# Patient Record
Sex: MALE | Race: WHITE | URBAN - METROPOLITAN AREA
[De-identification: names, ages, dates, MRNs, and addresses within clinical notes are randomized per-mention and may not be internally consistent; named-entity substitution may affect disease eponyms.]

---

## 2018-11-28 ENCOUNTER — OFFICE VISIT (OUTPATIENT)
Dept: FAMILY MEDICINE | Facility: CLINIC | Age: 33
End: 2018-11-28
Payer: COMMERCIAL

## 2018-11-28 VITALS
WEIGHT: 193 LBS | BODY MASS INDEX: 25.58 KG/M2 | DIASTOLIC BLOOD PRESSURE: 68 MMHG | HEART RATE: 67 BPM | SYSTOLIC BLOOD PRESSURE: 102 MMHG | TEMPERATURE: 96.3 F | HEIGHT: 73 IN

## 2018-11-28 DIAGNOSIS — R41.89 COGNITIVE CHANGES: Primary | ICD-10-CM

## 2018-11-28 LAB
BASOPHILS # BLD AUTO: 0 10E9/L (ref 0–0.2)
BASOPHILS NFR BLD AUTO: 0.5 %
CORTIS SERPL-MCNC: 8.9 UG/DL (ref 4–22)
DIFFERENTIAL METHOD BLD: NORMAL
EOSINOPHIL # BLD AUTO: 0.1 10E9/L (ref 0–0.7)
EOSINOPHIL NFR BLD AUTO: 1.9 %
ERYTHROCYTE [DISTWIDTH] IN BLOOD BY AUTOMATED COUNT: 13.1 % (ref 10–15)
HCT VFR BLD AUTO: 45.6 % (ref 40–53)
HGB BLD-MCNC: 15.4 G/DL (ref 13.3–17.7)
LYMPHOCYTES # BLD AUTO: 2.5 10E9/L (ref 0.8–5.3)
LYMPHOCYTES NFR BLD AUTO: 38.7 %
MCH RBC QN AUTO: 29.7 PG (ref 26.5–33)
MCHC RBC AUTO-ENTMCNC: 33.8 G/DL (ref 31.5–36.5)
MCV RBC AUTO: 88 FL (ref 78–100)
MONOCYTES # BLD AUTO: 0.6 10E9/L (ref 0–1.3)
MONOCYTES NFR BLD AUTO: 9.7 %
NEUTROPHILS # BLD AUTO: 3.2 10E9/L (ref 1.6–8.3)
NEUTROPHILS NFR BLD AUTO: 49.2 %
PLATELET # BLD AUTO: 365 10E9/L (ref 150–450)
RBC # BLD AUTO: 5.18 10E12/L (ref 4.4–5.9)
VIT B12 SERPL-MCNC: 745 PG/ML (ref 193–986)
WBC # BLD AUTO: 6.4 10E9/L (ref 4–11)

## 2018-11-28 PROCEDURE — 85025 COMPLETE CBC W/AUTO DIFF WBC: CPT | Performed by: NURSE PRACTITIONER

## 2018-11-28 PROCEDURE — 84270 ASSAY OF SEX HORMONE GLOBUL: CPT | Performed by: NURSE PRACTITIONER

## 2018-11-28 PROCEDURE — 82533 TOTAL CORTISOL: CPT | Performed by: NURSE PRACTITIONER

## 2018-11-28 PROCEDURE — 84403 ASSAY OF TOTAL TESTOSTERONE: CPT | Performed by: NURSE PRACTITIONER

## 2018-11-28 PROCEDURE — 36415 COLL VENOUS BLD VENIPUNCTURE: CPT | Performed by: NURSE PRACTITIONER

## 2018-11-28 PROCEDURE — 82607 VITAMIN B-12: CPT | Performed by: NURSE PRACTITIONER

## 2018-11-28 PROCEDURE — 84443 ASSAY THYROID STIM HORMONE: CPT | Performed by: NURSE PRACTITIONER

## 2018-11-28 PROCEDURE — 84439 ASSAY OF FREE THYROXINE: CPT | Performed by: NURSE PRACTITIONER

## 2018-11-28 PROCEDURE — 86780 TREPONEMA PALLIDUM: CPT | Performed by: NURSE PRACTITIONER

## 2018-11-28 PROCEDURE — 99204 OFFICE O/P NEW MOD 45 MIN: CPT | Performed by: NURSE PRACTITIONER

## 2018-11-28 ASSESSMENT — ANXIETY QUESTIONNAIRES
1. FEELING NERVOUS, ANXIOUS, OR ON EDGE: SEVERAL DAYS
7. FEELING AFRAID AS IF SOMETHING AWFUL MIGHT HAPPEN: SEVERAL DAYS
IF YOU CHECKED OFF ANY PROBLEMS ON THIS QUESTIONNAIRE, HOW DIFFICULT HAVE THESE PROBLEMS MADE IT FOR YOU TO DO YOUR WORK, TAKE CARE OF THINGS AT HOME, OR GET ALONG WITH OTHER PEOPLE: SOMEWHAT DIFFICULT
3. WORRYING TOO MUCH ABOUT DIFFERENT THINGS: SEVERAL DAYS
GAD7 TOTAL SCORE: 4
5. BEING SO RESTLESS THAT IT IS HARD TO SIT STILL: NOT AT ALL
2. NOT BEING ABLE TO STOP OR CONTROL WORRYING: NOT AT ALL
6. BECOMING EASILY ANNOYED OR IRRITABLE: SEVERAL DAYS

## 2018-11-28 ASSESSMENT — PATIENT HEALTH QUESTIONNAIRE - PHQ9
SUM OF ALL RESPONSES TO PHQ QUESTIONS 1-9: 2
5. POOR APPETITE OR OVEREATING: NOT AT ALL

## 2018-11-28 NOTE — MR AVS SNAPSHOT
"              After Visit Summary   11/28/2018    Ever Palm    MRN: [id number]           Patient Information     Date Of Birth          1985        Visit Information        Provider Department      11/28/2018 11:40 AM Ramses Syed NP [facility], [place]        Today's Diagnoses     Cognitive changes    -  1      Care Instructions    1. Labs today. I will be in touch with results.    2. If we strike out with more obvious and common causes (endocrine function, depression / anxiety), we can move to neuropsychological testing and/or imaging.           Follow-ups after your visit        Follow-up notes from your care team     Return in about 2 weeks (around 12/12/2018).      Who to contact     If you have questions or need follow up information about today's clinic visit or your schedule please contact [facility] [place] directly at [phone].  Normal or non-critical lab and imaging results will be communicated to you by MyChart, letter or phone within 4 business days after the clinic has received the results. If you do not hear from us within 7 days, please contact the clinic through MyChart or phone. If you have a critical or abnormal lab result, we will notify you by phone as soon as possible.  Submit refill requests through [facility] or call your pharmacy and they will forward the refill request to us. Please allow 3 business days for your refill to be completed.          Additional Information About Your Visit        MyChart Information     [facility] [facility] lets you send messages to your doctor, view your test results, renew your prescriptions, schedule appointments and more. To sign up, go to [email or web address] . Click on \"Log in\" on the left side of the screen, which will take you to the Welcome page. Then click on \"Sign up Now\" on the right side of the page.     You will be asked to enter the access code listed below, as well as some personal information. Please follow the " "directions to create your username and password.     Your access code is: [email or web address]  Expires: 2019 12:15 PM     Your access code will  in 90 days. If you need help or a new code, please call your [place] clinic or [phone].        Care EveryWhere ID     This is your Care EveryWhere ID. This could be used by other organizations to access your [place] medical records  [id number]        Your Vitals Were     Pulse Temperature Height BMI (Body Mass Index)          67 96.3  F (35.7  C) (Tympanic) 6' 1\" (1.854 m) 25.46 kg/m2         Blood Pressure from Last 3 Encounters:   18 102/68    Weight from Last 3 Encounters:   18 193 lb (87.5 kg)              We Performed the Following     CBC with platelets and differential     Cortisol     Treponema Abs w Reflex to RPR and Titer     TSH with free T4 reflex     Vitamin B12        Primary Care Provider Office Phone # Fax #    [facility] [phone]       [address] [place] [id number]        Equal Access to Services     JOSIE MONZON AH: Hadii aad ku hadasho Soomaali, waaxda luqadaha, qaybta kaalmada adeegyada, waxay feleciain hayinderjitn donna cain . So [facility] [phone].    ATENCIÓN: Si habla español, tiene a clay disposición servicios gratuitos de asistencia lingüística. Llame al [phone].    We comply with applicable federal civil rights laws and [place] laws. We do not discriminate on the basis of race, color, national origin, age, disability, sex, sexual orientation, or gender identity.            Thank you!     Thank you for choosing [facility]  for your care. Our goal is always to provide you with excellent care. Hearing back from our patients is one way we can continue to improve our services. Please take a few minutes to complete the written survey that you may receive in the mail after your visit with us. Thank you!             Your Updated Medication List - Protect " others around you: Learn how to safely use, store and throw away your medicines at www.disposemymeds.org.      Notice  As of 11/28/2018 12:15 PM    You have not been prescribed any medications.

## 2018-11-28 NOTE — PATIENT INSTRUCTIONS
1. Labs today. I will be in touch with results.    2. If we strike out with more obvious and common causes (endocrine function, depression / anxiety), we can move to neuropsychological testing and/or imaging.

## 2018-11-28 NOTE — PROGRESS NOTES
"  SUBJECTIVE:                                                      Ever Palm is a 33 year old male who presents to clinic today for the following health issues:    Concern - Pt states that 2 weeks ago he had a cold, is over it for the most part but hasn't recovered from the fatigue, being run down and \"foggy headed\".  Feels like he is in a daze. Gets about 6.5 hours of sleep a night. Drinks coffee daily.     HPI: For past two to three months has noted feeling \"cloudy-headed.\" Focus and memory have suffered dramatically. Forgets names. Has lost his \"mental edge\" at work and feels like his concentration and retention of information have been much poorer lately. 4 kids (two sets of twins - 4 and 1 years). No medications or supplements. Diet is normal with no changes. No physical symptoms (some fatigue but no worse lately). Feels a bit overwhelmed at home, so anxiety could be a contributor (he and wife work opposite schedules). No passing out, dizziness, or feelings of heart racing or beating irregularly. No family history of early-onset dementia or Parkinson's Disease. No alcohol or drug use. No tremor or other motor symptoms. No sensory changes / deficit. No headache, vision, or hearing changes. No fever, chills, night sweats, unintentional weight loss. Had a rash on his hands / palms last year.    Problem list and histories reviewed & adjusted, as indicated.  Additional history: as documented    Reviewed and updated as needed this visit by clinical staff  Tobacco  Allergies  Meds  Problems  Med Hx  Surg Hx  Fam Hx  Soc Hx        Reviewed and updated as needed this visit by Provider  Tobacco  Allergies  Meds  Problems  Med Hx  Surg Hx  Fam Hx  Soc Hx        ROS:  Constitutional, HEENT, cardiovascular, pulmonary, GI, musculoskeletal, neuro, skin, endocrine and psych systems are negative, except as otherwise noted.    OBJECTIVE:                                                      /68 (BP " "Location: Right arm, Patient Position: Chair, Cuff Size: Adult Regular)  Pulse 67  Temp 96.3  F (35.7  C) (Tympanic)  Ht 6' 1\" (1.854 m)  Wt 193 lb (87.5 kg)  BMI 25.46 kg/m2 Body mass index is 25.46 kg/(m^2).   GENERAL: healthy, alert, well nourished, well hydrated, no distress  HENT: ear canals- normal; TMs- normal; Nose- normal; Mouth- no ulcers, no lesions  NECK: no tenderness, no adenopathy, no asymmetry, no masses, no stiffness; thyroid- normal to palpation  RESP: lungs clear to auscultation - no rales, no rhonchi, no wheezes  CV: regular rates and rhythm, normal S1 S2, no S3 or S4 and no murmur, no click or rub -  SKIN: no suspicious lesions, no rashes  NEURO: strength and tone- normal, sensory exam- grossly normal, mentation- intact, speech- normal, reflexes- symmetric  PSYCH: Alert and oriented times 3; speech- coherent , normal rate and volume; able to articulate logical thoughts, able to abstract reason, no tangential thoughts, no hallucinations or delusions, affect- normal  PHQ-9 SCORE 11/28/2018   PHQ-9 Total Score 2     ELIJAH-7 SCORE 11/28/2018   Total Score 4       Diagnostic test results:  No results found for this or any previous visit (from the past 24 hour(s)).    ASSESSMENT/PLAN:                                                      Ever was seen today for fatigue. No obvious culprit for his symptoms. 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 Second most likely would be anxiety and/or depression. PHQ and ELIJAH do not necessarily reflect this, but he endorses feeling overwhelmed and irritable more than is typical for him. Third in line in the differential would be endocrine / metabolic / hematologic abnormalities. Will check TSH, cortisol, testosterone, B12, and CBC today. Will also check syphilis given mysterious rash noted on his hands last year. If none " of these studies are revealing, will think about neuropsychological testing +/- imaging. He agrees with this plan, and all questions answered. Will follow up with him when these labs have resulted.     Diagnoses and all orders for this visit:    Cognitive changes  -     TSH with free T4 reflex  -     Cortisol  -     Vitamin B12  -     CBC with platelets and differential  -     Treponema Abs w Reflex to RPR and Titer  -     Testosterone Free and Total      Risks, benefits and alternatives of treatments discussed. Plan agreed upon and all questions answered.      Follow-Up: Return in about 2 weeks (around 12/12/2018).    See Patient Instructions      Ramses Syed, APRN, CNP

## 2018-11-29 LAB
T PALLIDUM AB SER QL: NONREACTIVE
T4 FREE SERPL-MCNC: 1.13 NG/DL (ref 0.76–1.46)
TSH SERPL DL<=0.005 MIU/L-ACNC: 5.68 MU/L (ref 0.4–4)

## 2018-11-29 ASSESSMENT — ANXIETY QUESTIONNAIRES: GAD7 TOTAL SCORE: 4

## 2018-11-30 LAB
SHBG SERPL-SCNC: 65 NMOL/L (ref 11–80)
TESTOST FREE SERPL-MCNC: 9.93 NG/DL (ref 4.7–24.4)
TESTOST SERPL-MCNC: 714 NG/DL (ref 240–950)

## 2018-12-26 ENCOUNTER — TELEPHONE (OUTPATIENT)
Dept: FAMILY MEDICINE | Facility: CLINIC | Age: 33
End: 2018-12-26

## 2018-12-26 NOTE — TELEPHONE ENCOUNTER
Please call patient and relate the following. I sent this via [facility] [employer] message a few weeks ago, but he has not yet read it. Thanks.    Ever Hanson.     I was wondering how you're feeling and if you've given any thought to what I mentioned in my last message regarding treating your noted hypothyroid state. Please let me know, and I can send over a prescription for you if that's something you'd like to try. If your symptoms have changed in any way, and you'd like to schedule an office visit to discuss things further, that would be just fine, as well. Please take care.

## 2018-12-26 NOTE — TELEPHONE ENCOUNTER
Left message on answering machine for patient to call back.    Anne Monzon,RN BSN  [facility]  [phone]

## 2018-12-27 NOTE — TELEPHONE ENCOUNTER
Called patient regarding providers message.  Patient states he is feeling fine and does not feel he needs medication at this time.      Patient states he is very busy with 2 sets of twins and has a very busy lifestyle.  Patient states he will be more attentive to how he is feeling and will discuss his condition with his wife who has had her thyroid removed.  Patient will contact provider if he would like to start the medication.    Routing to provider as FYI, no action needed.      ROLAND Jane, RN  [employer] [facility]

## 2019-12-08 ENCOUNTER — HEALTH MAINTENANCE LETTER (OUTPATIENT)
Age: 34
End: 2019-12-08